# Patient Record
Sex: FEMALE
[De-identification: names, ages, dates, MRNs, and addresses within clinical notes are randomized per-mention and may not be internally consistent; named-entity substitution may affect disease eponyms.]

---

## 2018-05-03 ENCOUNTER — RESULT REVIEW (OUTPATIENT)
Age: 33
End: 2018-05-03

## 2021-03-10 PROBLEM — Z00.00 ENCOUNTER FOR PREVENTIVE HEALTH EXAMINATION: Status: ACTIVE | Noted: 2021-03-10

## 2021-03-15 ENCOUNTER — APPOINTMENT (OUTPATIENT)
Dept: OBGYN | Facility: CLINIC | Age: 36
End: 2021-03-15
Payer: COMMERCIAL

## 2021-03-15 DIAGNOSIS — R10.2 PELVIC AND PERINEAL PAIN: ICD-10-CM

## 2021-03-15 DIAGNOSIS — Z11.3 ENCOUNTER FOR SCREENING FOR INFECTIONS WITH A PREDOMINANTLY SEXUAL MODE OF TRANSMISSION: ICD-10-CM

## 2021-03-15 PROCEDURE — 99213 OFFICE O/P EST LOW 20 MIN: CPT

## 2021-03-16 DIAGNOSIS — Z82.49 FAMILY HISTORY OF ISCHEMIC HEART DISEASE AND OTHER DISEASES OF THE CIRCULATORY SYSTEM: ICD-10-CM

## 2021-03-16 DIAGNOSIS — Z86.39 PERSONAL HISTORY OF OTHER ENDOCRINE, NUTRITIONAL AND METABOLIC DISEASE: ICD-10-CM

## 2021-03-16 DIAGNOSIS — Z86.19 PERSONAL HISTORY OF OTHER INFECTIOUS AND PARASITIC DISEASES: ICD-10-CM

## 2021-03-16 DIAGNOSIS — Z83.49 FAMILY HISTORY OF OTHER ENDOCRINE, NUTRITIONAL AND METABOLIC DISEASES: ICD-10-CM

## 2021-03-16 DIAGNOSIS — F17.210 NICOTINE DEPENDENCE, CIGARETTES, UNCOMPLICATED: ICD-10-CM

## 2021-03-16 DIAGNOSIS — Z78.9 OTHER SPECIFIED HEALTH STATUS: ICD-10-CM

## 2021-03-16 DIAGNOSIS — Z87.42 PERSONAL HISTORY OF OTHER DISEASES OF THE FEMALE GENITAL TRACT: ICD-10-CM

## 2021-03-16 DIAGNOSIS — Z72.89 OTHER PROBLEMS RELATED TO LIFESTYLE: ICD-10-CM

## 2021-03-16 DIAGNOSIS — Z83.3 FAMILY HISTORY OF DIABETES MELLITUS: ICD-10-CM

## 2021-03-16 LAB
C TRACH RRNA SPEC QL NAA+PROBE: NOT DETECTED
CYTOLOGY CVX/VAG DOC THIN PREP: NORMAL
HIV1+2 AB SPEC QL IA.RAPID: NONREACTIVE
N GONORRHOEA RRNA SPEC QL NAA+PROBE: NOT DETECTED
SOURCE AMPLIFICATION: NORMAL

## 2021-03-18 ENCOUNTER — TRANSCRIPTION ENCOUNTER (OUTPATIENT)
Age: 36
End: 2021-03-18

## 2021-03-18 LAB
HBV SURFACE AB SER QL: ABNORMAL
HBV SURFACE AG SER QL: NONREACTIVE
HCV AB SER QL: NONREACTIVE
HCV S/CO RATIO: 0.12 S/CO
HSV 1+2 IGG SER IA-IMP: NEGATIVE
HSV 1+2 IGG SER IA-IMP: POSITIVE
HSV1 IGG SER QL: 22.6 INDEX
HSV2 IGG SER QL: 0.44 INDEX
T PALLIDUM AB SER QL IA: NEGATIVE

## 2021-04-12 ENCOUNTER — APPOINTMENT (OUTPATIENT)
Dept: OBGYN | Facility: CLINIC | Age: 36
End: 2021-04-12
Payer: COMMERCIAL

## 2021-04-12 LAB
HCG UR QL: NEGATIVE
QUALITY CONTROL: YES

## 2021-04-12 PROCEDURE — 99213 OFFICE O/P EST LOW 20 MIN: CPT | Mod: 25

## 2021-04-12 PROCEDURE — 81025 URINE PREGNANCY TEST: CPT

## 2021-04-12 PROCEDURE — 57454 BX/CURETT OF CERVIX W/SCOPE: CPT

## 2021-04-12 PROCEDURE — 99072 ADDL SUPL MATRL&STAF TM PHE: CPT

## 2021-04-24 ENCOUNTER — TRANSCRIPTION ENCOUNTER (OUTPATIENT)
Age: 36
End: 2021-04-24

## 2021-04-24 LAB
CORE LAB BIOPSY: NORMAL
CYTOLOGY CVX/VAG DOC THIN PREP: ABNORMAL

## 2021-04-26 ENCOUNTER — APPOINTMENT (OUTPATIENT)
Dept: OBGYN | Facility: CLINIC | Age: 36
End: 2021-04-26
Payer: COMMERCIAL

## 2021-04-26 VITALS
TEMPERATURE: 97 F | BODY MASS INDEX: 24.43 KG/M2 | DIASTOLIC BLOOD PRESSURE: 62 MMHG | WEIGHT: 152 LBS | HEART RATE: 62 BPM | HEIGHT: 66 IN | SYSTOLIC BLOOD PRESSURE: 120 MMHG

## 2021-04-26 DIAGNOSIS — N89.8 OTHER SPECIFIED NONINFLAMMATORY DISORDERS OF VAGINA: ICD-10-CM

## 2021-04-26 PROCEDURE — 99213 OFFICE O/P EST LOW 20 MIN: CPT

## 2021-04-26 PROCEDURE — 99072 ADDL SUPL MATRL&STAF TM PHE: CPT

## 2021-04-26 NOTE — HISTORY OF PRESENT ILLNESS
[Patient reported PAP Smear was abnormal] : Patient reported PAP Smear was abnormal [TextBox_19] : NA [TextBox_25] : NA [PapSmeardate] : 4/21 [TextBox_31] : TRICHOMONA WAS FOUND [TextBox_37] : NA [TextBox_43] : NA [LMPDate] : 04/06/21 [MensesFreq] : 28 [MensesLength] : 4 [FreeTextEntry1] : 4/18/21

## 2021-04-26 NOTE — PHYSICAL EXAM
[Labia Majora] : normal [Labia Minora] : normal [Normal] :  normal [Discharge] : a  ~M vaginal discharge was present [Moderate] : moderate [White] : white [Thick] : thick [FreeTextEntry3] : Unremarkable appearing external urethral meatus\par

## 2021-04-26 NOTE — DISCUSSION/SUMMARY
[FreeTextEntry1] : pt s/p colposcopic directed cervical biopsy for LGSIL- bx normal results d/w pt\par also repeat pap test at time of visit- d/w pt\par new diagnosis of Trich and BV d/w pt\par pt with Vaginal discharge\par rx Tindamax and diflucan\par RTO for maninder- 4 weeks\par d/w pt partner needs to be treated

## 2021-04-28 LAB — HPV HIGH+LOW RISK DNA PNL CVX: DETECTED

## 2021-05-25 ENCOUNTER — APPOINTMENT (OUTPATIENT)
Dept: OBGYN | Facility: CLINIC | Age: 36
End: 2021-05-25
Payer: COMMERCIAL

## 2021-05-25 VITALS — HEIGHT: 66 IN | BODY MASS INDEX: 24.43 KG/M2 | WEIGHT: 152 LBS

## 2021-05-25 DIAGNOSIS — R87.810 CERVICAL HIGH RISK HUMAN PAPILLOMAVIRUS (HPV) DNA TEST POSITIVE: ICD-10-CM

## 2021-05-25 DIAGNOSIS — B96.89 ACUTE VAGINITIS: ICD-10-CM

## 2021-05-25 DIAGNOSIS — N76.0 ACUTE VAGINITIS: ICD-10-CM

## 2021-05-25 DIAGNOSIS — A59.9 TRICHOMONIASIS, UNSPECIFIED: ICD-10-CM

## 2021-05-25 PROCEDURE — 99213 OFFICE O/P EST LOW 20 MIN: CPT

## 2021-05-25 PROCEDURE — 99072 ADDL SUPL MATRL&STAF TM PHE: CPT

## 2021-05-25 NOTE — HISTORY OF PRESENT ILLNESS
[Patient reported PAP Smear was abnormal] : Patient reported PAP Smear was abnormal [N] : Patient reports normal menses [Y] : Positive pregnancy history [FreeTextEntry1] : vag cx for STEVEN [TextBox_4] : 34 YO PRESENTS FOR FOLLOW UP FOR TRICHOMONAS AND BV INFECTION.  WAS RECOMMENDED TO FOLLOW UP IN 3 WEEKS\par pap 4/2021 WNL- +HR HPV\par +Trich and shift for BV- pt treated with Tindamax [Mammogramdate] : 11/1/19 [PapSmeardate] : 04/24/21 [TextBox_31] : POS BV [LMPDate] : 05/03/21 [MensesFreq] : 28 [MensesLength] : 3 [PGHxTotal] : 2 [[facility]

## 2021-05-25 NOTE — DISCUSSION/SUMMARY
[FreeTextEntry1] : Unremarkable exam\par vag cx STEVEN performed\par pt counselled\par RTO 11/2021 for pap/hpv

## 2021-05-25 NOTE — PHYSICAL EXAM
[Appropriately responsive] : appropriately responsive [Alert] : alert [No Acute Distress] : no acute distress [Soft] : soft [Non-tender] : non-tender [Non-distended] : non-distended [Oriented x3] : oriented x3 [Labia Majora] : normal [Labia Minora] : normal [Normal] : normal [FreeTextEntry3] : Unremarkable appearing external urethral meatus\par

## 2021-07-09 ENCOUNTER — NON-APPOINTMENT (OUTPATIENT)
Age: 36
End: 2021-07-09

## 2021-07-09 RX ORDER — TINIDAZOLE 500 MG/1
500 TABLET, FILM COATED ORAL DAILY
Qty: 8 | Refills: 0 | Status: COMPLETED | COMMUNITY
Start: 2021-04-26 | End: 2021-07-09

## 2021-07-09 RX ORDER — NORETHINDRONE ACETATE AND ETHINYL ESTRADIOL AND FERROUS FUMARATE 1.5-30(21)
1.5-3 KIT ORAL DAILY
Refills: 0 | Status: DISCONTINUED | COMMUNITY
End: 2021-07-09

## 2021-07-09 RX ORDER — FLUCONAZOLE 150 MG/1
150 TABLET ORAL DAILY
Qty: 1 | Refills: 0 | Status: COMPLETED | COMMUNITY
Start: 2021-04-26 | End: 2021-07-09

## 2021-07-09 RX ORDER — TINIDAZOLE 500 MG/1
500 TABLET, FILM COATED ORAL DAILY
Qty: 8 | Refills: 0 | Status: COMPLETED | COMMUNITY
Start: 2021-04-24 | End: 2021-07-09

## 2021-12-13 ENCOUNTER — RX RENEWAL (OUTPATIENT)
Age: 36
End: 2021-12-13

## 2022-02-07 ENCOUNTER — APPOINTMENT (OUTPATIENT)
Dept: OBGYN | Facility: CLINIC | Age: 37
End: 2022-02-07
Payer: COMMERCIAL

## 2022-02-07 VITALS — DIASTOLIC BLOOD PRESSURE: 80 MMHG | SYSTOLIC BLOOD PRESSURE: 134 MMHG

## 2022-02-07 DIAGNOSIS — D21.9 BENIGN NEOPLASM OF CONNECTIVE AND OTHER SOFT TISSUE, UNSPECIFIED: ICD-10-CM

## 2022-02-07 DIAGNOSIS — Z12.39 ENCOUNTER FOR OTHER SCREENING FOR MALIGNANT NEOPLASM OF BREAST: ICD-10-CM

## 2022-02-07 DIAGNOSIS — Z30.41 ENCOUNTER FOR SURVEILLANCE OF CONTRACEPTIVE PILLS: ICD-10-CM

## 2022-02-07 DIAGNOSIS — Z01.419 ENCOUNTER FOR GYNECOLOGICAL EXAMINATION (GENERAL) (ROUTINE) W/OUT ABNORMAL FINDINGS: ICD-10-CM

## 2022-02-07 DIAGNOSIS — R87.612 LOW GRADE SQUAMOUS INTRAEPITHELIAL LESION ON CYTOLOGIC SMEAR OF CERVIX (LGSIL): ICD-10-CM

## 2022-02-07 PROCEDURE — 99395 PREV VISIT EST AGE 18-39: CPT

## 2022-02-07 PROCEDURE — 99072 ADDL SUPL MATRL&STAF TM PHE: CPT

## 2022-02-07 RX ORDER — NORETHINDRONE ACETATE AND ETHINYL ESTRADIOL AND FERROUS FUMARATE 1MG-20(21)
1-20 KIT ORAL DAILY
Qty: 3 | Refills: 1 | Status: ACTIVE | COMMUNITY
Start: 2022-02-07 | End: 1900-01-01

## 2022-02-07 NOTE — PHYSICAL EXAM
[Chaperone Declined] : Patient declined chaperone [Appropriately responsive] : appropriately responsive [Alert] : alert [No Acute Distress] : no acute distress [No Lymphadenopathy] : no lymphadenopathy [Regular Rate Rhythm] : regular rate rhythm [Soft] : soft [Non-tender] : non-tender [Non-distended] : non-distended [No Mass] : no mass [Oriented x3] : oriented x3 [Examination Of The Breasts] : a normal appearance [No Masses] : no breast masses were palpable [Labia Majora] : normal [Labia Minora] : normal [Discharge] : a  ~M vaginal discharge was present [Scant] : scant [Foul Smelling] : foul smelling [White] : white [Thin] : thin [Normal] : normal [Uterine Adnexae] : normal

## 2022-02-07 NOTE — DISCUSSION/SUMMARY
[FreeTextEntry1] : Annual preventitive care gyn exam\par no complaints\par Fibroid uterus\par Pelvic sono- RTO 6/2022\par Breast screening\par H/O LGSIL\par pap/hpv\par pt counselled

## 2022-02-07 NOTE — HISTORY OF PRESENT ILLNESS
[Patient reported mammogram was normal] : Patient reported mammogram was normal [Patient reported breast sonogram was normal] : Patient reported breast sonogram was normal [Patient reported PAP Smear was normal] : Patient reported PAP Smear was normal [Patient declined bone density test] : Patient declined bone density test [Patient declined colonoscopy] : Patient declined colonoscopy [Y] : Positive pregnancy history [Regular Cycle Intervals] : periods have been regular [Menarche Age: ____] : age at menarche was [unfilled] [No] : Patient does not have concerns regarding sex [Currently Active] : currently active [TextBox_4] : pt here for her annul, no c/o- denies any vaginal complaints [Mammogramdate] : 01/2019 [BreastSonogramDate] : 01/2019 [PapSmeardate] : 02/2021 [TextBox_31] : lgsil- +hr hpv- colposcopy- wnl-2021 [TextBox_37] : na [LMPDate] : 01/18/22 [MensesLength] : 5 [PGHxTotal] : 3 [[facility]Living] : 2 [PGHxABSpont] : 1 [FreeTextEntry1] : 1/6/2022 [FreeTextEntry3] : birth control pills

## 2022-02-13 LAB — HPV HIGH+LOW RISK DNA PNL CVX: NOT DETECTED

## 2022-03-27 DIAGNOSIS — R92.8 OTHER ABNORMAL AND INCONCLUSIVE FINDINGS ON DIAGNOSTIC IMAGING OF BREAST: ICD-10-CM

## 2022-04-07 ENCOUNTER — TRANSCRIPTION ENCOUNTER (OUTPATIENT)
Age: 37
End: 2022-04-07

## 2022-04-22 ENCOUNTER — NON-APPOINTMENT (OUTPATIENT)
Age: 37
End: 2022-04-22

## 2022-04-22 PROBLEM — R92.8 ABNORMAL FINDING ON BREAST IMAGING: Status: ACTIVE | Noted: 2022-04-22

## 2022-06-15 ENCOUNTER — TRANSCRIPTION ENCOUNTER (OUTPATIENT)
Age: 37
End: 2022-06-15

## 2022-06-23 ENCOUNTER — ASOB RESULT (OUTPATIENT)
Age: 37
End: 2022-06-23

## 2022-06-23 ENCOUNTER — APPOINTMENT (OUTPATIENT)
Dept: OBGYN | Facility: CLINIC | Age: 37
End: 2022-06-23
Payer: COMMERCIAL

## 2022-06-23 PROCEDURE — 99072 ADDL SUPL MATRL&STAF TM PHE: CPT

## 2022-06-23 PROCEDURE — 76830 TRANSVAGINAL US NON-OB: CPT

## 2022-07-05 ENCOUNTER — TRANSCRIPTION ENCOUNTER (OUTPATIENT)
Age: 37
End: 2022-07-05

## 2022-08-09 ENCOUNTER — TRANSCRIPTION ENCOUNTER (OUTPATIENT)
Age: 37
End: 2022-08-09

## 2022-08-17 ENCOUNTER — TRANSCRIPTION ENCOUNTER (OUTPATIENT)
Age: 37
End: 2022-08-17

## 2022-08-18 ENCOUNTER — TRANSCRIPTION ENCOUNTER (OUTPATIENT)
Age: 37
End: 2022-08-18

## 2022-08-18 ENCOUNTER — OUTPATIENT (OUTPATIENT)
Dept: OUTPATIENT SERVICES | Facility: HOSPITAL | Age: 37
LOS: 1 days | Discharge: HOME | End: 2022-08-18

## 2022-09-14 DIAGNOSIS — F10.10 ALCOHOL ABUSE, UNCOMPLICATED: ICD-10-CM

## 2023-02-01 RX ORDER — NORETHINDRONE ACETATE AND ETHINYL ESTRADIOL AND FERROUS FUMARATE 1.5-30(21)
1.5-3 KIT ORAL
Qty: 3 | Refills: 2 | Status: ACTIVE | COMMUNITY
Start: 2021-07-09 | End: 1900-01-01

## 2023-03-07 ENCOUNTER — APPOINTMENT (OUTPATIENT)
Dept: OBGYN | Facility: CLINIC | Age: 38
End: 2023-03-07